# Patient Record
(demographics unavailable — no encounter records)

---

## 2025-05-22 NOTE — ASSESSMENT
[FreeTextEntry1] : 83 yo gentleman with recent diagnosis of carcinoid syndrome, referred for evaluation of iron deficiency anemia.   11/20/23 Chromogranin A 222.7.  Serotonin serum 57.   Carcinoid syndrome ?: Reviewed all his records ? elevation of Chromogranin A, followed by DOTATE PET/scan negative on 8/27/2020. Reports episodic flushing. Chromogranin A  222.7. Refers sweating, redness of the face, intermittent episodes.   DOTATE PET/scan was declined by the insurance company.     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   Greater than 50% of the encounter time was spent on counseling and coordination of care for ALBIN    and I have spent 30   minutes of face-to-face time with the patient.  RTC prn.  PCP Dr Mj Odonnell [phone].

## 2025-05-22 NOTE — HISTORY OF PRESENT ILLNESS
[0 - No Distress] : Distress Level: 0 [80: Normal activity with effort; some signs or symptoms of disease.] : 80: Normal activity with effort; some signs or symptoms of disease.  [de-identified] : 84 yo gentleman with recent diagnosis of carcinoid syndrome, referred for evaluation of iron deficiency anemia.   5/31/23- 6.1 Hb 11.5g/dl, Hct 34.5, MCV 94 RDW 12.2 folate >20 B 12 390 creatinine 1.82 Denies fever, night sweats or weight loss. Denies diarrhea. Denies bleeding. Reports having EGD and colonoscopy this year, reports was normal.  Reviewed all his records ? elevation of Chromogranin A, followed by DOTATE PET/scan negative on 8/27/2020.  [de-identified] : Patient is feeling well. Denies melena or hematochezia.  11/20/23 Chromogranin A 222.7.  Serotonin serum 57.  Patient had a recent  admission to the hospital 4/2025 with syncope, likely cardiac related.  No other changes in medical, surgical or social history since 11/21/24.